# Patient Record
Sex: FEMALE | Race: WHITE | NOT HISPANIC OR LATINO | Employment: OTHER | URBAN - METROPOLITAN AREA
[De-identification: names, ages, dates, MRNs, and addresses within clinical notes are randomized per-mention and may not be internally consistent; named-entity substitution may affect disease eponyms.]

---

## 2017-05-03 ENCOUNTER — PREPPED CHART (OUTPATIENT)
Dept: URBAN - METROPOLITAN AREA CLINIC 43 | Facility: CLINIC | Age: 72
End: 2017-05-03

## 2018-05-02 ENCOUNTER — ESTABLISHED COMPREHENSIVE EXAM (OUTPATIENT)
Dept: URBAN - METROPOLITAN AREA CLINIC 43 | Facility: CLINIC | Age: 73
End: 2018-05-02

## 2018-05-02 DIAGNOSIS — H43.812: ICD-10-CM

## 2018-05-02 DIAGNOSIS — H11.002: ICD-10-CM

## 2018-05-02 DIAGNOSIS — H04.123: ICD-10-CM

## 2018-05-02 DIAGNOSIS — H00.023: ICD-10-CM

## 2018-05-02 DIAGNOSIS — H00.026: ICD-10-CM

## 2018-05-02 DIAGNOSIS — Z96.1: ICD-10-CM

## 2018-05-02 PROCEDURE — 92015 DETERMINE REFRACTIVE STATE: CPT

## 2018-05-02 PROCEDURE — 1036F TOBACCO NON-USER: CPT

## 2018-05-02 PROCEDURE — 92014 COMPRE OPH EXAM EST PT 1/>: CPT

## 2018-05-02 PROCEDURE — G8427 DOCREV CUR MEDS BY ELIG CLIN: HCPCS

## 2018-05-02 PROCEDURE — G8785 BP SCRN NO PERF AT INTERVAL: HCPCS

## 2018-05-02 ASSESSMENT — VISUAL ACUITY
OS_SC: 20/60+1
OD_SC: J4
OS_CC: J1
OS_PH: 20/30-2
OS_SC: J2
OD_SC: 20/25-2
OD_CC: J1

## 2018-05-02 ASSESSMENT — TONOMETRY
OS_IOP_MMHG: 14
OD_IOP_MMHG: 14

## 2018-09-17 ENCOUNTER — EST. PATIENT EMERGENCY (OUTPATIENT)
Dept: URBAN - METROPOLITAN AREA CLINIC 43 | Facility: CLINIC | Age: 73
End: 2018-09-17

## 2018-09-17 DIAGNOSIS — H43.812: ICD-10-CM

## 2018-09-17 DIAGNOSIS — H33.002: ICD-10-CM

## 2018-09-17 PROCEDURE — 1036F TOBACCO NON-USER: CPT

## 2018-09-17 PROCEDURE — G8427 DOCREV CUR MEDS BY ELIG CLIN: HCPCS

## 2018-09-17 PROCEDURE — G8785 BP SCRN NO PERF AT INTERVAL: HCPCS

## 2018-09-17 PROCEDURE — 92012 INTRM OPH EXAM EST PATIENT: CPT

## 2018-09-17 PROCEDURE — G9903 PT SCRN TBCO ID AS NON USER: HCPCS

## 2018-09-17 ASSESSMENT — VISUAL ACUITY
OD_SC: 20/25-1
OS_SC: 20/60+2
OS_PH: 20/40+2

## 2018-09-17 ASSESSMENT — TONOMETRY: OS_IOP_MMHG: 14

## 2019-10-11 ENCOUNTER — ESTABLISHED COMPREHENSIVE EXAM (OUTPATIENT)
Dept: URBAN - METROPOLITAN AREA CLINIC 43 | Facility: CLINIC | Age: 74
End: 2019-10-11

## 2019-10-11 DIAGNOSIS — H43.813: ICD-10-CM

## 2019-10-11 DIAGNOSIS — H04.123: ICD-10-CM

## 2019-10-11 DIAGNOSIS — H02.886: ICD-10-CM

## 2019-10-11 DIAGNOSIS — H02.883: ICD-10-CM

## 2019-10-11 PROCEDURE — 92014 COMPRE OPH EXAM EST PT 1/>: CPT

## 2019-10-11 ASSESSMENT — TONOMETRY
OS_IOP_MMHG: 14
OD_IOP_MMHG: 14

## 2019-10-11 ASSESSMENT — VISUAL ACUITY
OS_CC: J1
OD_SC: J2
OD_CC: J1
OS_SC: 20/40-1
OS_SC: J1
OD_SC: 20/30+2

## 2020-07-21 NOTE — PATIENT DISCUSSION
"""Patient has a fluid filled retention cyst in the lower fornix on the right eye.  Discussed with patient ""

## 2022-03-24 ENCOUNTER — COMPREHENSIVE EXAM (OUTPATIENT)
Dept: URBAN - METROPOLITAN AREA CLINIC 43 | Facility: CLINIC | Age: 77
End: 2022-03-24

## 2022-03-24 DIAGNOSIS — H02.883: ICD-10-CM

## 2022-03-24 DIAGNOSIS — H43.813: ICD-10-CM

## 2022-03-24 DIAGNOSIS — H04.123: ICD-10-CM

## 2022-03-24 DIAGNOSIS — H02.886: ICD-10-CM

## 2022-03-24 DIAGNOSIS — H40.013: ICD-10-CM

## 2022-03-24 DIAGNOSIS — H52.4: ICD-10-CM

## 2022-03-24 PROCEDURE — 92014 COMPRE OPH EXAM EST PT 1/>: CPT

## 2022-03-24 PROCEDURE — 92015 DETERMINE REFRACTIVE STATE: CPT

## 2022-03-24 PROCEDURE — 92133 CPTRZD OPH DX IMG PST SGM ON: CPT

## 2022-03-24 ASSESSMENT — VISUAL ACUITY
OD_CC: J1
OS_CC: J1
OS_SC: 20/60-1
OU_SC: 20/25-2
OS_PH: 20/40-1
OD_SC: 20/30+2

## 2022-03-24 ASSESSMENT — TONOMETRY
OS_IOP_MMHG: 11
OD_IOP_MMHG: 12

## 2022-03-30 ENCOUNTER — FOLLOW UP (OUTPATIENT)
Dept: URBAN - METROPOLITAN AREA CLINIC 43 | Facility: CLINIC | Age: 77
End: 2022-03-30

## 2022-03-30 DIAGNOSIS — Z79.899: ICD-10-CM

## 2022-03-30 PROCEDURE — 92083 EXTENDED VISUAL FIELD XM: CPT

## 2022-03-30 PROCEDURE — 92012 INTRM OPH EXAM EST PATIENT: CPT

## 2022-03-30 ASSESSMENT — TONOMETRY
OD_IOP_MMHG: 14
OS_IOP_MMHG: 14

## 2022-03-30 ASSESSMENT — VISUAL ACUITY
OD_SC: 20/25-1
OS_SC: 20/50-1

## 2022-07-12 NOTE — PATIENT DISCUSSION
Patient to trial SCL trial 1. Patient to use her own stock of OD and provided trial of OS. Patient to call to finalize.

## 2022-07-12 NOTE — PATIENT DISCUSSION
Scattered MA's, OS. Discussed with patient will send referral to [facility] to be seen within a month or two to establish care.

## 2023-04-03 ENCOUNTER — COMPREHENSIVE EXAM (OUTPATIENT)
Dept: URBAN - METROPOLITAN AREA CLINIC 43 | Facility: CLINIC | Age: 78
End: 2023-04-03

## 2023-04-03 DIAGNOSIS — H35.372: ICD-10-CM

## 2023-04-03 DIAGNOSIS — H43.813: ICD-10-CM

## 2023-04-03 DIAGNOSIS — H02.886: ICD-10-CM

## 2023-04-03 DIAGNOSIS — H02.883: ICD-10-CM

## 2023-04-03 DIAGNOSIS — Z79.899: ICD-10-CM

## 2023-04-03 DIAGNOSIS — H04.123: ICD-10-CM

## 2023-04-03 DIAGNOSIS — H40.013: ICD-10-CM

## 2023-04-03 PROCEDURE — 92014 COMPRE OPH EXAM EST PT 1/>: CPT

## 2023-04-03 ASSESSMENT — TONOMETRY
OS_IOP_MMHG: 13
OD_IOP_MMHG: 13

## 2023-04-03 ASSESSMENT — VISUAL ACUITY
OS_SC: J2
OD_SC: J3
OU_SC: J1
OD_CC: J1
OD_SC: 20/20-2
OS_CC: J1
OU_SC: 20/20-2
OS_SC: 20/50+2

## 2023-04-05 ENCOUNTER — FOLLOW UP (OUTPATIENT)
Dept: URBAN - METROPOLITAN AREA CLINIC 43 | Facility: CLINIC | Age: 78
End: 2023-04-05

## 2023-04-05 DIAGNOSIS — Z79.899: ICD-10-CM

## 2023-04-05 PROCEDURE — 92083 EXTENDED VISUAL FIELD XM: CPT

## 2023-04-05 PROCEDURE — 92012 INTRM OPH EXAM EST PATIENT: CPT

## 2023-04-05 ASSESSMENT — TONOMETRY
OD_IOP_MMHG: 12
OS_IOP_MMHG: 13

## 2023-04-05 ASSESSMENT — VISUAL ACUITY
OD_SC: 20/20
OS_SC: 20/40+2

## 2024-04-03 ENCOUNTER — COMPREHENSIVE EXAM (OUTPATIENT)
Dept: URBAN - METROPOLITAN AREA CLINIC 43 | Facility: CLINIC | Age: 79
End: 2024-04-03

## 2024-04-03 DIAGNOSIS — H02.883: ICD-10-CM

## 2024-04-03 DIAGNOSIS — H02.886: ICD-10-CM

## 2024-04-03 DIAGNOSIS — H35.372: ICD-10-CM

## 2024-04-03 DIAGNOSIS — H43.813: ICD-10-CM

## 2024-04-03 DIAGNOSIS — Z79.899: ICD-10-CM

## 2024-04-03 DIAGNOSIS — H04.123: ICD-10-CM

## 2024-04-03 DIAGNOSIS — H40.013: ICD-10-CM

## 2024-04-03 PROCEDURE — 92014 COMPRE OPH EXAM EST PT 1/>: CPT

## 2024-04-03 PROCEDURE — 92083 EXTENDED VISUAL FIELD XM: CPT

## 2024-04-03 ASSESSMENT — VISUAL ACUITY
OU_SC: J1
OD_SC: 20/20
OS_SC: 20/50
OS_SC: J1
OS_PH: 20/25-2
OD_SC: J2
OU_SC: 20/20

## 2024-04-03 ASSESSMENT — TONOMETRY
OS_IOP_MMHG: 14
OD_IOP_MMHG: 14

## 2025-04-08 ENCOUNTER — COMPREHENSIVE EXAM (OUTPATIENT)
Age: 80
End: 2025-04-08

## 2025-04-08 DIAGNOSIS — H43.813: ICD-10-CM

## 2025-04-08 DIAGNOSIS — H02.883: ICD-10-CM

## 2025-04-08 DIAGNOSIS — H04.123: ICD-10-CM

## 2025-04-08 DIAGNOSIS — H02.886: ICD-10-CM

## 2025-04-08 DIAGNOSIS — Z79.899: ICD-10-CM

## 2025-04-08 DIAGNOSIS — H40.013: ICD-10-CM

## 2025-04-08 DIAGNOSIS — H35.372: ICD-10-CM

## 2025-04-08 PROCEDURE — 92014 COMPRE OPH EXAM EST PT 1/>: CPT

## 2025-04-08 PROCEDURE — 92133 CPTRZD OPH DX IMG PST SGM ON: CPT

## 2025-04-25 ENCOUNTER — FOLLOW UP (OUTPATIENT)
Age: 80
End: 2025-04-25

## 2025-04-25 DIAGNOSIS — Z79.899: ICD-10-CM

## 2025-04-25 PROCEDURE — 92083 EXTENDED VISUAL FIELD XM: CPT

## 2025-04-25 PROCEDURE — 92012 INTRM OPH EXAM EST PATIENT: CPT
